# Patient Record
Sex: MALE | Race: BLACK OR AFRICAN AMERICAN | NOT HISPANIC OR LATINO | Employment: FULL TIME | URBAN - METROPOLITAN AREA
[De-identification: names, ages, dates, MRNs, and addresses within clinical notes are randomized per-mention and may not be internally consistent; named-entity substitution may affect disease eponyms.]

---

## 2021-04-28 ENCOUNTER — HOSPITAL ENCOUNTER (OUTPATIENT)
Dept: SLEEP MEDICINE | Facility: HOSPITAL | Age: 41
Discharge: HOME | End: 2021-04-28
Attending: INTERNAL MEDICINE

## 2021-04-28 ENCOUNTER — OUTSIDE FACILITY SERVICE (OUTPATIENT)
Dept: SLEEP MEDICINE | Facility: HOSPITAL | Age: 41
End: 2021-04-28

## 2021-04-28 PROCEDURE — 99244 OFF/OP CNSLTJ NEW/EST MOD 40: CPT | Performed by: INTERNAL MEDICINE

## 2021-05-07 ENCOUNTER — HOSPITAL ENCOUNTER (OUTPATIENT)
Dept: SLEEP MEDICINE | Facility: HOSPITAL | Age: 41
Discharge: HOME | End: 2021-05-07
Attending: INTERNAL MEDICINE

## 2021-05-12 ENCOUNTER — OUTSIDE FACILITY SERVICE (OUTPATIENT)
Dept: SLEEP MEDICINE | Facility: HOSPITAL | Age: 41
End: 2021-05-12

## 2021-05-12 PROCEDURE — 95806 SLEEP STUDY UNATT&RESP EFFT: CPT | Performed by: INTERNAL MEDICINE

## 2022-07-08 NOTE — CONSULTS
DOING WELL 10 WKS, RETX LUCENTIS AND REPEAT EVERY 10 WKS X 2 - MILD EDEMA AT 13 WKS. Patient: GALDINO LEVY     Acct: [id number]     Report: #[id number]  MR #:  [id number]     DOS: 2021 2208     : 1980  DICTATING: CLAUDIA PANTOJA  ***Signed***  --------------------------------------------------------------------------------------------------------------------                              [facility] [employer] [facility]                          [place], [place]  [address]           __________________________________________________________________________         Patient Name:                   Attending Physician:    Galdino Levy M.D.         Patient Visit # MR #            Admit Date  Disch Date     Location    [id number]    [id number]      2021                 SLEEP- -         Date of Birth    1980    __________________________________________________________________________    0814 - SLEEP OFFICE VISIT         DATE OF SERVICE:  2021         SLEEP CONSULTATION NOTE         CONSULTATION REQUESTED BY:    MAYRA Heart/Yesenia Parker.         CHIEF COMPLAINT:    Snoring, fatigue and daytime excessive sleepiness.         HISTORY OF PRESENTING ILLNESS:    This is a 40 year old male who works for the [employer].  He has been    sent for evaluation of these symptoms, which have been going on for about 6    years.  The symptoms are getting worse.  Now, his wife is telling him that he    snores in all positions and it is so loud.  He has daytime excessive    sleepiness.  He falls asleep even when inappropriately.  He has snoring in    all positions, does not have any headaches, does not grind his teeth.    Sometimes he has woken up choking and gasping for breath.  He also has a    history of head trauma in the past when he was using a parachute and had a    concussion.  He normally wakes up 2-3 times to go to the bathroom.         PAST MEDICAL HISTORY:     History of hypertension.         PAST SURGICAL HISTORY:    None.         MEDICATIONS:    Diltiazem 180 mg daily.         ALLERGIES:    No known drug allergies.         SOCIAL HISTORY:    He is , lives with his wife.  Does not smoke cigarettes, occasionally    drinks alcohol, drinks one cup of coffee.         FAMILY HISTORY:    Negative for sleep apnea.  Negative for diabetes, heart disease.         REVIEW OF SYSTEMS:    SLEEP:  Complains of snoring, non-restorative sleep.  He has daytime    excessive sleepiness with Esmond Sleepiness Scale of 13.    GENERAL: No fever, no fatigue, no poor appetite.    HEENT: No recurrent nosebleed, no ear pain, no sore mouth, no persistent    hoarseness, no nasal congestion, no postnasal drip.    CARDIOPULMONARY: Positive for irregular heartbeat.  No chest pain, no cough,    no swollen ankles.    GASTROINTESTINAL: No swallowing problem, no frequent heartburn.    ENDOCRINE: No excessive thirst, no always too cold, no always too warm, no    weight change, no hair loss.    MUSCULOSKELETAL: No painful joints, no back pain, no redness, no stiffness.    URINARY: No difficulty in urination, no painful urination, no frequent    urination.    NEURO/PSYCH: No fainting spells, no dizziness, no anxiety, no depression, no    headache.    HEME/LYMPH: No swollen glands, no bruises/bleeds easily.    SKIN: No rash, no changes in nails.         PHYSICAL EXAMINATION:    CONSTITUTIONAL/VITALS: His blood pressure is 139/85, heart rate is 52, neck    size is 15 1/2 inches.  Body mass index is 29.  He weighs 234 pounds.    HEAD: Atraumatic, normocephalic.    EYES: Pupils are round, equal and reacting to light and accommodation,    conjunctiva normal.    NOSE: No polyps, septum in then midline.    THROAT: Tonsils not enlarged.  Oral airway is Mallampati class III.    NECK: Trachea in midline.  Thyroid not enlarged.    RESPIRATORY: Breath sounds normal, no wheezes, no crackles.    CARDIOVASCULAR:  Heart rate normal, regular rhythm, no murmurs, no edema.    GASTROINTESTINAL: Abdomen soft and nontender, liver not enlarged, no ascites.    MUSCULOSKELETAL: Full range of motion of all 4 extremities, no neck rigidity,    no temporomandibular joint tenderness.    SKIN: Warm and moist, no cyanosis, no clubbing.    NEUROLOGIC: Oriented x3, no gross defects, gait normal.    PSYCHIATRIC: Mood is normal, memory and judgment normal.         ASSESSMENT AND PLAN:    1.  Sleep apnea, unspecified.  The patient's symptoms and physical        examination consistent with sleep apnea.  I talked to the patient about        the signs and symptoms of sleep apnea, pathophysiology of sleep apnea and        the consequences of untreated sleep apnea.  I also educated him about the        testing, discussed the various methods, and I feel that the patient will        be better served with a home sleep test.  I have arranged a home sleep        test and will be seeing him after the home sleep test is done.        Recommendations will be made after the home sleep test is done.    2.  Snoring, secondary to sleep apnea.    3.  Daytime excessive sleepiness with Jal Sleepiness Scale of 13,        secondary to sleep apnea and non-restorative sleep.    4.  Fatigue, secondary to sleep apnea and non-restorative sleep.    5.  History of hypertension.  The patient also has history of hypertension        who is on Diltiazem.  Treating the sleep apnea also should help sustain        better control of his blood pressure, as there is a direct relationship        with hypertension and sleep apnea.         To be electronically signed in [place]    [id number] CLAUDIA PANTOJA M.D.         RR:sean    D:  04/28/2021 11:00    T:  04/29/2021 21:54    #[id number]         CC: MAYRA SLOAN/CONSTANTIN MARIN.         Until signed, this is an unconfirmed preliminary report that may contain    errors and is subject to change.         Electronically signed by  CLAUDIA PANTOJA  05/03/2021 17:04     Disclaimer: Converted hospital document may not contain table formatting or lab diagrams. Please see [facility] for authenticated document.